# Patient Record
Sex: MALE | Race: BLACK OR AFRICAN AMERICAN
[De-identification: names, ages, dates, MRNs, and addresses within clinical notes are randomized per-mention and may not be internally consistent; named-entity substitution may affect disease eponyms.]

---

## 2020-06-01 ENCOUNTER — HOSPITAL ENCOUNTER (EMERGENCY)
Dept: HOSPITAL 92 - ERS | Age: 65
Discharge: ANOTHER HOSPITAL | End: 2020-06-01
Payer: COMMERCIAL

## 2020-06-01 DIAGNOSIS — N18.9: ICD-10-CM

## 2020-06-01 DIAGNOSIS — U07.1: Primary | ICD-10-CM

## 2020-06-01 DIAGNOSIS — J18.9: ICD-10-CM

## 2020-06-01 DIAGNOSIS — E11.22: ICD-10-CM

## 2020-06-01 DIAGNOSIS — Z87.891: ICD-10-CM

## 2020-06-01 DIAGNOSIS — Z79.899: ICD-10-CM

## 2020-06-01 DIAGNOSIS — I12.9: ICD-10-CM

## 2020-06-01 LAB
ALBUMIN SERPL BCG-MCNC: 3.9 G/DL (ref 3.4–4.8)
ALP SERPL-CCNC: 41 U/L (ref 40–110)
ALT SERPL W P-5'-P-CCNC: 41 U/L (ref 8–55)
ANION GAP SERPL CALC-SCNC: 18 MMOL/L (ref 10–20)
AST SERPL-CCNC: 49 U/L (ref 5–34)
BASOPHILS # BLD AUTO: 0.1 THOU/UL (ref 0–0.2)
BASOPHILS NFR BLD AUTO: 1.4 % (ref 0–1)
BILIRUB SERPL-MCNC: 0.8 MG/DL (ref 0.2–1.2)
BUN SERPL-MCNC: 42 MG/DL (ref 8.4–25.7)
CALCIUM SERPL-MCNC: 8.7 MG/DL (ref 7.8–10.44)
CHLORIDE SERPL-SCNC: 98 MMOL/L (ref 98–107)
CK MB SERPL-MCNC: 2.1 NG/ML (ref 0–6.6)
CK SERPL-CCNC: 485 U/L (ref 30–200)
CO2 SERPL-SCNC: 23 MMOL/L (ref 23–31)
CREAT CL PREDICTED SERPL C-G-VRATE: 0 ML/MIN (ref 70–130)
EOSINOPHIL # BLD AUTO: 0 THOU/UL (ref 0–0.7)
EOSINOPHIL NFR BLD AUTO: 0.2 % (ref 0–10)
GLOBULIN SER CALC-MCNC: 4.5 G/DL (ref 2.4–3.5)
GLUCOSE SERPL-MCNC: 114 MG/DL (ref 80–115)
HGB BLD-MCNC: 16 G/DL (ref 14–18)
LYMPHOCYTES # BLD: 0.8 THOU/UL (ref 1.2–3.4)
LYMPHOCYTES NFR BLD AUTO: 15.1 % (ref 21–51)
MCH RBC QN AUTO: 29.2 PG (ref 27–31)
MCV RBC AUTO: 89 FL (ref 78–98)
MDIFF COMPLETE?: YES
MONOCYTES # BLD AUTO: 0.3 THOU/UL (ref 0.11–0.59)
MONOCYTES NFR BLD AUTO: 6.2 % (ref 0–10)
NEUTROPHILS # BLD AUTO: 4 THOU/UL (ref 1.4–6.5)
NEUTROPHILS NFR BLD AUTO: 77 % (ref 42–75)
PLATELET # BLD AUTO: 87 THOU/UL (ref 130–400)
POTASSIUM SERPL-SCNC: 4.2 MMOL/L (ref 3.5–5.1)
RBC # BLD AUTO: 5.47 MILL/UL (ref 4.7–6.1)
SODIUM SERPL-SCNC: 135 MMOL/L (ref 136–145)
WBC # BLD AUTO: 5.2 THOU/UL (ref 4.8–10.8)

## 2020-06-01 PROCEDURE — 83605 ASSAY OF LACTIC ACID: CPT

## 2020-06-01 PROCEDURE — 36415 COLL VENOUS BLD VENIPUNCTURE: CPT

## 2020-06-01 PROCEDURE — 84484 ASSAY OF TROPONIN QUANT: CPT

## 2020-06-01 PROCEDURE — 82553 CREATINE MB FRACTION: CPT

## 2020-06-01 PROCEDURE — 82550 ASSAY OF CK (CPK): CPT

## 2020-06-01 PROCEDURE — 94760 N-INVAS EAR/PLS OXIMETRY 1: CPT

## 2020-06-01 PROCEDURE — 87040 BLOOD CULTURE FOR BACTERIA: CPT

## 2020-06-01 PROCEDURE — 71045 X-RAY EXAM CHEST 1 VIEW: CPT

## 2020-06-01 PROCEDURE — 96365 THER/PROPH/DIAG IV INF INIT: CPT

## 2020-06-01 PROCEDURE — 85025 COMPLETE CBC W/AUTO DIFF WBC: CPT

## 2020-06-01 PROCEDURE — 87635 SARS-COV-2 COVID-19 AMP PRB: CPT

## 2020-06-01 PROCEDURE — 80053 COMPREHEN METABOLIC PANEL: CPT

## 2020-06-01 PROCEDURE — U0003 INFECTIOUS AGENT DETECTION BY NUCLEIC ACID (DNA OR RNA); SEVERE ACUTE RESPIRATORY SYNDROME CORONAVIRUS 2 (SARS-COV-2) (CORONAVIRUS DISEASE [COVID-19]), AMPLIFIED PROBE TECHNIQUE, MAKING USE OF HIGH THROUGHPUT TECHNOLOGIES AS DESCRIBED BY CMS-2020-01-R: HCPCS

## 2020-06-01 PROCEDURE — 93005 ELECTROCARDIOGRAM TRACING: CPT

## 2020-06-01 NOTE — RAD
CHEST ONE VIEW:

 

History: Chest pain 

 

Comparison: 2015

 

FINDINGS: 

The lungs are hypoinflated with vascular crowding. Concern for a possible superimposed right middle l
obe and right upper lobe airspace opacity. No pneumothorax. No significant effusion. 

 

Cardiac silhouette and mediastinal contours are similar. 

 

IMPRESSION: 

Findings concerning for a woody right middle lobe and right upper lobe airspace opacities which may re
flect pneumonia. Follow up after treatment recommended. 

 

POS: HOME

## 2020-06-06 NOTE — EKG
Test Reason : 

Blood Pressure : ***/*** mmHG

Vent. Rate : 113 BPM     Atrial Rate : 113 BPM

   P-R Int : 148 ms          QRS Dur : 070 ms

    QT Int : 336 ms       P-R-T Axes : 032 -16 -03 degrees

   QTc Int : 460 ms

 

Sinus tachycardia with Premature atrial complexes

Minimal voltage criteria for LVH, may be normal variant

Borderline ECG

 

Confirmed by LEOBARDO COLBERT DO ([id number]),  ZORAN ROMAN ([id number]) on 6/6/2020 2:09:24 PM

 

Referred By:             Confirmed By:LEOBARDO COLBERT DO